# Patient Record
Sex: MALE
[De-identification: names, ages, dates, MRNs, and addresses within clinical notes are randomized per-mention and may not be internally consistent; named-entity substitution may affect disease eponyms.]

---

## 2023-01-27 PROBLEM — Z00.00 ENCOUNTER FOR PREVENTIVE HEALTH EXAMINATION: Status: ACTIVE | Noted: 2023-01-27

## 2023-01-30 ENCOUNTER — RESULT CHARGE (OUTPATIENT)
Age: 73
End: 2023-01-30

## 2023-01-30 ENCOUNTER — FORM ENCOUNTER (OUTPATIENT)
Age: 73
End: 2023-01-30

## 2023-01-30 ENCOUNTER — APPOINTMENT (OUTPATIENT)
Dept: ORTHOPEDIC SURGERY | Facility: CLINIC | Age: 73
End: 2023-01-30
Payer: MEDICARE

## 2023-01-30 VITALS — BODY MASS INDEX: 40.43 KG/M2 | HEIGHT: 74 IN | WEIGHT: 315 LBS

## 2023-01-30 DIAGNOSIS — Z95.5 ATHEROSCLEROTIC HEART DISEASE OF NATIVE CORONARY ARTERY W/OUT ANGINA PECTORIS: ICD-10-CM

## 2023-01-30 DIAGNOSIS — I10 ESSENTIAL (PRIMARY) HYPERTENSION: ICD-10-CM

## 2023-01-30 DIAGNOSIS — Z87.19 PERSONAL HISTORY OF OTHER DISEASES OF THE DIGESTIVE SYSTEM: ICD-10-CM

## 2023-01-30 DIAGNOSIS — E78.00 PURE HYPERCHOLESTEROLEMIA, UNSPECIFIED: ICD-10-CM

## 2023-01-30 DIAGNOSIS — M16.0 BILATERAL PRIMARY OSTEOARTHRITIS OF HIP: ICD-10-CM

## 2023-01-30 DIAGNOSIS — Z86.39 PERSONAL HISTORY OF OTHER ENDOCRINE, NUTRITIONAL AND METABOLIC DISEASE: ICD-10-CM

## 2023-01-30 DIAGNOSIS — I25.10 ATHEROSCLEROTIC HEART DISEASE OF NATIVE CORONARY ARTERY W/OUT ANGINA PECTORIS: ICD-10-CM

## 2023-01-30 PROCEDURE — 99204 OFFICE O/P NEW MOD 45 MIN: CPT

## 2023-01-30 PROCEDURE — 99214 OFFICE O/P EST MOD 30 MIN: CPT

## 2023-01-30 PROCEDURE — 72170 X-RAY EXAM OF PELVIS: CPT

## 2023-01-30 PROCEDURE — 72100 X-RAY EXAM L-S SPINE 2/3 VWS: CPT

## 2023-01-30 NOTE — PHYSICAL EXAM
[Flexion] : flexion [Extension] : extension [Bending to left] : bending to left [Bending to right] : bending to right [NL (0)] : extension 0 degrees [5___] : hamstring 5[unfilled]/5 [Equivocal] : equivocal Capri [Facet arthropathy] : Facet arthropathy [Disc space narrowing] : Disc space narrowing [Spondylolithesis] : Spondylolithesis [Mild arthritis (Tonnis Grade 1)] : Mild arthritis (Tonnis Grade 1) [Left] : left knee [AP] : anteroposterior [Lateral] : lateral [Outside films reviewed] : Outside films reviewed [Degenerative change] : Degenerative change [de-identified] : radicular complaints posterior thigh to the knees worse left leg laterally  [] : no effusion [TWNoteComboBox7] : flexion 115 degrees

## 2023-01-30 NOTE — IMAGING
[FreeTextEntry1] : multilevel ddd with spondylolisthesis and narrowing [de-identified] : b/l hip oa with cam lesions

## 2023-01-30 NOTE — HISTORY OF PRESENT ILLNESS
[Sudden] : sudden [Dull/Aching] : dull/aching [Tightness] : tightness [de-identified] : 1-30-23- He states chronic history of baseline left knee issues. Last week twisted and developed sharp pain in the lateral aspect of the left knee. also notes radicular pain throughout bilateral lower extremities worse with prolonged ambulation or prolonged standing.\par He went to urgent care they took xrays of his knee which show- mod medially based oa and a patellar osteophyte noted on the lateral view.\par \par He has sig pmh- barretts, dm, cad with stend is on plavix and asa [] : no [FreeTextEntry1] : left knee  [FreeTextEntry3] : 1/26/23 [FreeTextEntry5] : no injury, patient started to feel pain in the knee [FreeTextEntry7] : up into back of thigh  [de-identified] : xrays

## 2023-01-31 ENCOUNTER — APPOINTMENT (OUTPATIENT)
Dept: MRI IMAGING | Facility: CLINIC | Age: 73
End: 2023-01-31
Payer: MEDICARE

## 2023-01-31 PROCEDURE — 72148 MRI LUMBAR SPINE W/O DYE: CPT

## 2023-02-07 ENCOUNTER — APPOINTMENT (OUTPATIENT)
Dept: ORTHOPEDIC SURGERY | Facility: CLINIC | Age: 73
End: 2023-02-07
Payer: MEDICARE

## 2023-02-07 PROCEDURE — 99213 OFFICE O/P EST LOW 20 MIN: CPT

## 2023-02-07 NOTE — REASON FOR VISIT
[FreeTextEntry2] : 2/7/23- Had MRI: IMPRESSION:\par Mild-moderate degenerative disc disease throughout the lower thoracic and lumbar spine.\par Disc bulges at L1-2 and L3-4 with a disc herniation at L4-5 without stenosis or nerve root compression.\par Disc herniation at L2-3 with mild compression of the right L3 nerve root in the lateral recess.\par Grade I spondylolisthesis at L5-S1 secondary to chronic bilateral spondylolysis of L5 without thecal sac or \par nerve root compression.

## 2023-02-07 NOTE — HISTORY OF PRESENT ILLNESS
[Sudden] : sudden [Dull/Aching] : dull/aching [Tightness] : tightness [de-identified] : 1-30-23- He states chronic history of baseline left knee issues. Last week twisted and developed sharp pain in the lateral aspect of the left knee. also notes radicular pain throughout bilateral lower extremities worse with prolonged ambulation or prolonged standing.\par He went to urgent care they took xrays of his knee which show- mod medially based oa and a patellar osteophyte noted on the lateral view.\par \par He has sig pmh- barretts, dm, cad with stend is on plavix and asa [] : no [FreeTextEntry1] : left knee  [FreeTextEntry3] : 1/26/23 [FreeTextEntry5] : no injury, patient started to feel pain in the knee [FreeTextEntry7] : up into back of thigh  [de-identified] : xrays

## 2023-02-07 NOTE — PHYSICAL EXAM
[Flexion] : flexion [Extension] : extension [Bending to left] : bending to left [Bending to right] : bending to right [Facet arthropathy] : Facet arthropathy [Disc space narrowing] : Disc space narrowing [Spondylolithesis] : Spondylolithesis [Mild arthritis (Tonnis Grade 1)] : Mild arthritis (Tonnis Grade 1) [NL (0)] : extension 0 degrees [5___] : hamstring 5[unfilled]/5 [Equivocal] : equivocal Capri [Left] : left knee [AP] : anteroposterior [Lateral] : lateral [Outside films reviewed] : Outside films reviewed [Degenerative change] : Degenerative change [de-identified] : radicular complaints posterior thigh to the knees worse left leg laterally  [] : no effusion [TWNoteComboBox7] : flexion 115 degrees

## 2023-02-07 NOTE — DISCUSSION/SUMMARY
[de-identified] : MRI reviewed, will start course of PT and see how he progresses. Possibility of LESIs discussed. I don't see any surgical issues

## 2023-03-16 ENCOUNTER — APPOINTMENT (OUTPATIENT)
Dept: ORTHOPEDIC SURGERY | Facility: CLINIC | Age: 73
End: 2023-03-16
Payer: MEDICARE

## 2023-03-16 DIAGNOSIS — M48.061 SPINAL STENOSIS, LUMBAR REGION WITHOUT NEUROGENIC CLAUDICATION: ICD-10-CM

## 2023-03-16 DIAGNOSIS — M54.16 RADICULOPATHY, LUMBAR REGION: ICD-10-CM

## 2023-03-16 DIAGNOSIS — M43.16 SPONDYLOLISTHESIS, LUMBAR REGION: ICD-10-CM

## 2023-03-16 DIAGNOSIS — M51.36 OTHER INTERVERTEBRAL DISC DEGENERATION, LUMBAR REGION: ICD-10-CM

## 2023-03-16 DIAGNOSIS — M17.12 UNILATERAL PRIMARY OSTEOARTHRITIS, LEFT KNEE: ICD-10-CM

## 2023-03-16 PROCEDURE — 99213 OFFICE O/P EST LOW 20 MIN: CPT

## 2023-03-16 NOTE — REASON FOR VISIT
[FreeTextEntry2] : 3/16/23- Did one PT session and pulled a groin muscle. Otherwise unchanged\par 2/7/23- Had MRI: IMPRESSION:\par Mild-moderate degenerative disc disease throughout the lower thoracic and lumbar spine.\par Disc bulges at L1-2 and L3-4 with a disc herniation at L4-5 without stenosis or nerve root compression.\par Disc herniation at L2-3 with mild compression of the right L3 nerve root in the lateral recess.\par Grade I spondylolisthesis at L5-S1 secondary to chronic bilateral spondylolysis of L5 without thecal sac or \par nerve root compression.

## 2023-03-16 NOTE — HISTORY OF PRESENT ILLNESS
[Sudden] : sudden [Dull/Aching] : dull/aching [Tightness] : tightness [de-identified] : 1-30-23- He states chronic history of baseline left knee issues. Last week twisted and developed sharp pain in the lateral aspect of the left knee. also notes radicular pain throughout bilateral lower extremities worse with prolonged ambulation or prolonged standing.\par He went to urgent care they took xrays of his knee which show- mod medially based oa and a patellar osteophyte noted on the lateral view.\par \par He has sig pmh- barretts, dm, cad with stend is on plavix and asa [] : no [FreeTextEntry1] : left knee  [FreeTextEntry3] : 1/26/23 [FreeTextEntry5] : no injury, patient started to feel pain in the knee [FreeTextEntry7] : up into back of thigh  [de-identified] : xrays

## 2023-03-16 NOTE — PHYSICAL EXAM
[Flexion] : flexion [Extension] : extension [Bending to left] : bending to left [Bending to right] : bending to right [Facet arthropathy] : Facet arthropathy [Disc space narrowing] : Disc space narrowing [Spondylolithesis] : Spondylolithesis [Mild arthritis (Tonnis Grade 1)] : Mild arthritis (Tonnis Grade 1) [NL (0)] : extension 0 degrees [5___] : hamstring 5[unfilled]/5 [Equivocal] : equivocal Capri [Left] : left knee [AP] : anteroposterior [Lateral] : lateral [Outside films reviewed] : Outside films reviewed [Degenerative change] : Degenerative change [de-identified] : radicular complaints posterior thigh to the knees worse left leg laterally  [] : no effusion [TWNoteComboBox7] : flexion 115 degrees

## 2023-03-30 ENCOUNTER — APPOINTMENT (OUTPATIENT)
Dept: PAIN MANAGEMENT | Facility: CLINIC | Age: 73
End: 2023-03-30